# Patient Record
Sex: MALE | Race: WHITE
[De-identification: names, ages, dates, MRNs, and addresses within clinical notes are randomized per-mention and may not be internally consistent; named-entity substitution may affect disease eponyms.]

---

## 2018-01-11 ENCOUNTER — HOSPITAL ENCOUNTER (OUTPATIENT)
Dept: HOSPITAL 19 - COL.LAB | Age: 33
End: 2018-01-11
Attending: PHYSICIAN ASSISTANT
Payer: SELF-PAY

## 2018-01-11 DIAGNOSIS — R06.00: Primary | ICD-10-CM

## 2018-01-11 LAB
ALBUMIN SERPL-MCNC: 4.7 GM/DL (ref 3.5–5)
ALP SERPL-CCNC: 71 U/L (ref 50–136)
ALT SERPL-CCNC: 48 U/L (ref 21–72)
ANION GAP SERPL CALC-SCNC: 15 MMOL/L (ref 7–16)
AST SERPL-CCNC: 21 U/L (ref 15–37)
BASOPHILS # BLD: 0.1 10*3/UL (ref 0–0.2)
BASOPHILS NFR BLD AUTO: 0.6 % (ref 0–2)
BILIRUB SERPL-MCNC: 0.4 MG/DL (ref 0–1)
BUN SERPL-MCNC: 15 MG/DL (ref 9–20)
CALCIUM SERPL-MCNC: 9.9 MG/DL (ref 8.4–10.2)
CHLORIDE SERPL-SCNC: 108 MMOL/L (ref 98–107)
CO2 SERPL-SCNC: 22 MMOL/L (ref 22–30)
CREAT SERPL-SCNC: 0.88 MG/DL (ref 0.66–1.25)
EOSINOPHIL # BLD: 0.4 10*3/UL (ref 0–0.7)
EOSINOPHIL NFR BLD: 4.7 % (ref 0–4)
ERYTHROCYTE [DISTWIDTH] IN BLOOD BY AUTOMATED COUNT: 13 % (ref 11.5–14.5)
GLUCOSE SERPL-MCNC: 106 MG/DL (ref 74–106)
GRANULOCYTES # BLD AUTO: 61.6 % (ref 42.2–75.2)
HCT VFR BLD AUTO: 47.2 % (ref 42–52)
HGB BLD-MCNC: 15.3 G/DL (ref 13.5–18)
LYMPHOCYTES # BLD: 1.9 10*3/UL (ref 1.2–3.4)
LYMPHOCYTES NFR BLD: 24.5 % (ref 20–51)
MCH RBC QN AUTO: 28 PG (ref 27–31)
MCHC RBC AUTO-ENTMCNC: 32 G/DL (ref 33–37)
MCV RBC AUTO: 88 FL (ref 80–100)
MONOCYTES # BLD: 0.6 10*3/UL (ref 0.1–0.6)
MONOCYTES NFR BLD AUTO: 8.1 % (ref 1.7–9.3)
NEUTROPHILS # BLD: 4.9 10*3/UL (ref 1.4–6.5)
PLATELET # BLD AUTO: 262 K/MM3 (ref 130–400)
PMV BLD AUTO: 10.2 FL (ref 7.4–10.4)
POTASSIUM SERPL-SCNC: 4.1 MMOL/L (ref 3.4–5)
PROT SERPL-MCNC: 8.3 GM/DL (ref 6.4–8.2)
RBC # BLD AUTO: 5.38 M/MM3 (ref 4.2–5.6)
SODIUM SERPL-SCNC: 144 MMOL/L (ref 137–145)

## 2018-02-08 ENCOUNTER — HOSPITAL ENCOUNTER (OUTPATIENT)
Dept: HOSPITAL 19 - COL.RAD | Age: 33
End: 2018-02-08
Attending: FAMILY MEDICINE
Payer: COMMERCIAL

## 2018-02-08 DIAGNOSIS — R06.00: Primary | ICD-10-CM

## 2018-02-22 ENCOUNTER — HOSPITAL ENCOUNTER (OUTPATIENT)
Dept: HOSPITAL 19 - COL.PUL | Age: 33
End: 2018-02-22
Attending: FAMILY MEDICINE
Payer: COMMERCIAL

## 2018-02-22 DIAGNOSIS — R06.00: Primary | ICD-10-CM

## 2021-03-06 ENCOUNTER — HOSPITAL ENCOUNTER (EMERGENCY)
Dept: HOSPITAL 19 - COL.ER | Age: 36
Discharge: HOME | End: 2021-03-06
Attending: EMERGENCY MEDICINE
Payer: SELF-PAY

## 2021-03-06 VITALS — TEMPERATURE: 97.6 F | SYSTOLIC BLOOD PRESSURE: 153 MMHG | DIASTOLIC BLOOD PRESSURE: 91 MMHG

## 2021-03-06 VITALS — BODY MASS INDEX: 45.57 KG/M2 | HEIGHT: 67.99 IN | WEIGHT: 300.71 LBS

## 2021-03-06 VITALS — HEART RATE: 74 BPM

## 2021-03-06 DIAGNOSIS — T78.40XA: ICD-10-CM

## 2021-03-06 DIAGNOSIS — R09.1: ICD-10-CM

## 2021-03-06 DIAGNOSIS — K46.9: Primary | ICD-10-CM

## 2021-03-06 DIAGNOSIS — Z90.49: ICD-10-CM

## 2021-03-06 DIAGNOSIS — K35.80: ICD-10-CM

## 2021-03-06 LAB
ALBUMIN SERPL-MCNC: 4.4 GM/DL (ref 3.5–5)
ALP SERPL-CCNC: 75 U/L (ref 50–136)
ALT SERPL-CCNC: 30 U/L (ref 4–49)
ANION GAP SERPL CALC-SCNC: 10 MMOL/L (ref 7–16)
AST SERPL-CCNC: 25 U/L (ref 15–37)
BASOPHILS # BLD: 0 10*3/UL (ref 0–0.2)
BASOPHILS NFR BLD AUTO: 0.4 % (ref 0–2)
BILIRUB SERPL-MCNC: 0.6 MG/DL (ref 0–1)
BUN SERPL-MCNC: 14 MG/DL (ref 9–20)
CALCIUM SERPL-MCNC: 9.5 MG/DL (ref 8.4–10.2)
CHLORIDE SERPL-SCNC: 103 MMOL/L (ref 98–107)
CO2 SERPL-SCNC: 28 MMOL/L (ref 22–30)
CREAT SERPL-SCNC: 0.83 UMOL/L (ref 0.66–1.25)
EOSINOPHIL # BLD: 0.3 10*3/UL (ref 0–0.7)
EOSINOPHIL NFR BLD: 2.9 % (ref 0–4)
ERYTHROCYTE [DISTWIDTH] IN BLOOD BY AUTOMATED COUNT: 13.2 % (ref 11.5–14.5)
GLUCOSE SERPL-MCNC: 93 MG/DL (ref 74–106)
GRANULOCYTES # BLD AUTO: 65.2 % (ref 42.2–75.2)
HCT VFR BLD AUTO: 47.2 % (ref 42–52)
HGB BLD-MCNC: 15 G/DL (ref 13.5–18)
LYMPHOCYTES # BLD: 2 10*3/UL (ref 1.2–3.4)
LYMPHOCYTES NFR BLD: 23.6 % (ref 20–51)
MCH RBC QN AUTO: 28 PG (ref 27–31)
MCHC RBC AUTO-ENTMCNC: 32 G/DL (ref 33–37)
MCV RBC AUTO: 89 FL (ref 80–100)
MONOCYTES # BLD: 0.6 10*3/UL (ref 0.1–0.6)
MONOCYTES NFR BLD AUTO: 7.4 % (ref 1.7–9.3)
NEUTROPHILS # BLD: 5.6 10*3/UL (ref 1.4–6.5)
PLATELET # BLD AUTO: 294 K/MM3 (ref 130–400)
PMV BLD AUTO: 9.4 FL (ref 7.4–10.4)
POTASSIUM SERPL-SCNC: 4.3 MMOL/L (ref 3.4–5)
PROT SERPL-MCNC: 8 GM/DL (ref 6.4–8.2)
RBC # BLD AUTO: 5.32 M/MM3 (ref 4.2–5.6)
SODIUM SERPL-SCNC: 141 MMOL/L (ref 137–145)

## 2022-05-05 ENCOUNTER — HOSPITAL ENCOUNTER (OUTPATIENT)
Dept: HOSPITAL 19 - SDCO | Age: 37
End: 2022-05-05
Attending: SURGERY
Payer: COMMERCIAL

## 2022-05-05 VITALS — HEART RATE: 95 BPM | SYSTOLIC BLOOD PRESSURE: 128 MMHG | DIASTOLIC BLOOD PRESSURE: 78 MMHG

## 2022-05-05 VITALS — HEART RATE: 88 BPM | SYSTOLIC BLOOD PRESSURE: 125 MMHG | DIASTOLIC BLOOD PRESSURE: 87 MMHG

## 2022-05-05 VITALS — TEMPERATURE: 97.7 F | DIASTOLIC BLOOD PRESSURE: 92 MMHG | SYSTOLIC BLOOD PRESSURE: 143 MMHG | HEART RATE: 95 BPM

## 2022-05-05 VITALS — SYSTOLIC BLOOD PRESSURE: 136 MMHG | HEART RATE: 86 BPM | DIASTOLIC BLOOD PRESSURE: 84 MMHG | TEMPERATURE: 98 F

## 2022-05-05 VITALS — SYSTOLIC BLOOD PRESSURE: 135 MMHG | HEART RATE: 86 BPM | DIASTOLIC BLOOD PRESSURE: 88 MMHG | TEMPERATURE: 98.4 F

## 2022-05-05 VITALS — BODY MASS INDEX: 46.68 KG/M2 | HEIGHT: 68 IN | WEIGHT: 307.99 LBS

## 2022-05-05 DIAGNOSIS — E66.01: ICD-10-CM

## 2022-05-05 DIAGNOSIS — K43.0: Primary | ICD-10-CM

## 2022-05-05 DIAGNOSIS — F17.220: ICD-10-CM

## 2022-05-05 PROCEDURE — C1781 MESH (IMPLANTABLE): HCPCS

## 2022-05-05 NOTE — NUR
1720  Report received from KIM Sherman.
 
1730  Visit with pt and pt's wife in room.  Pt reports feeling no nausea now
following the Phenergan.  Pt desires to go home.  Pt's wife wants to make sure
he doesn't feel nauseated or having pain.
 
1745  Pt sitting up on side of bed in preparation for ambulating to the
restroom.
 
1750  Pt ambulates to restroom with "soreness" in abdomen.  Pt continues to
state that he doesn't have nausea, and he states that his pain is more
tolerable as he walks.
 
1752  Pt voids and ambulates back to his discharge bay with this RN assist.
Pt then dresses himself with his wife's assist.  Pt and wife agree that pt
feels well enough to go home.
 
1813  Pt transferred out of hospital via wheelchair and KIM Day assist, to
private vehicle driven by pt's wife.

## 2022-05-05 NOTE — NUR
1600 PT RETURNED TO \[facility]\"" VIA CART. ALERT AND ORIENTED. MONITORS ATTACHED.
INTERVALS AND ALARMS SET. VSS. PT REPORTS NAUSEA AND FEELING "HOT".
FAN, COOL WASH CLOTH, ICE PACKS PROVIDED. SPRITE PROVIDED.
 
1615  VSS. PT REPORTS NAUSEA.
 
1630  VSS. PT REPORTS NAUSEA. PT REFUSED ANTIEMETICS.
 
1645  EDUCATED PT ON ANTIEMETIC EFFECTS. PT AGREES TO TRY MEDICATION.
MARIE PROVIDED AND EDUCATED PT TO SPLINT ABDOMEN.
 
1715  REVIEWED EDUCATION PACKET AND DISCHARGE INSTRUCTIONS WITH PT AND SPOUSE
AND ANSWERED ALL QUESTIONS. OFFERED PT UP TO RESTROOM, PT REQUEST TO REST
LONGER.
 
1720 REPORT GIVEN TO KIM GREER.